# Patient Record
Sex: FEMALE | Race: WHITE
[De-identification: names, ages, dates, MRNs, and addresses within clinical notes are randomized per-mention and may not be internally consistent; named-entity substitution may affect disease eponyms.]

---

## 2021-11-29 ENCOUNTER — HOSPITAL ENCOUNTER (EMERGENCY)
Dept: HOSPITAL 26 - MED | Age: 66
Discharge: HOME | End: 2021-11-29
Payer: COMMERCIAL

## 2021-11-29 VITALS — DIASTOLIC BLOOD PRESSURE: 69 MMHG | SYSTOLIC BLOOD PRESSURE: 147 MMHG

## 2021-11-29 VITALS — BODY MASS INDEX: 34.93 KG/M2 | WEIGHT: 185 LBS | HEIGHT: 61 IN

## 2021-11-29 VITALS — SYSTOLIC BLOOD PRESSURE: 117 MMHG | DIASTOLIC BLOOD PRESSURE: 58 MMHG
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
## 2021-11-29 DIAGNOSIS — S52.531A: Primary | ICD-10-CM
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
## 2021-11-29 PROCEDURE — 99283 EMERGENCY DEPT VISIT LOW MDM: CPT

## 2021-11-29 PROCEDURE — 73110 X-RAY EXAM OF WRIST: CPT

## 2021-11-29 PROCEDURE — 29125 APPL SHORT ARM SPLINT STATIC: CPT

## 2021-11-29 NOTE — NUR
SUGAR TONG SPLINT APPLIED TO RIGHT ARM; +cms WITHIN NORMAL BEFORE AND AFTER. PT 
TOLERATED SPLINT WELL.

## 2021-11-29 NOTE — NUR
Patient states pain remains, reports pump to RLQ region for pain management. 
"It's either Dilaudid or Fentanyl with an antiinflammatory. It doesn't help."
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
